# Patient Record
Sex: MALE | Race: WHITE | NOT HISPANIC OR LATINO | URBAN - METROPOLITAN AREA
[De-identification: names, ages, dates, MRNs, and addresses within clinical notes are randomized per-mention and may not be internally consistent; named-entity substitution may affect disease eponyms.]

---

## 2023-01-16 ENCOUNTER — INPATIENT (INPATIENT)
Facility: HOSPITAL | Age: 63
LOS: 1 days | Discharge: HOME | DRG: 920 | End: 2023-01-18
Attending: STUDENT IN AN ORGANIZED HEALTH CARE EDUCATION/TRAINING PROGRAM | Admitting: STUDENT IN AN ORGANIZED HEALTH CARE EDUCATION/TRAINING PROGRAM
Payer: COMMERCIAL

## 2023-01-16 VITALS
TEMPERATURE: 98 F | SYSTOLIC BLOOD PRESSURE: 115 MMHG | DIASTOLIC BLOOD PRESSURE: 60 MMHG | WEIGHT: 197.98 LBS | OXYGEN SATURATION: 98 % | RESPIRATION RATE: 18 BRPM | HEART RATE: 70 BPM

## 2023-01-16 DIAGNOSIS — E11.9 TYPE 2 DIABETES MELLITUS WITHOUT COMPLICATIONS: ICD-10-CM

## 2023-01-16 DIAGNOSIS — E78.5 HYPERLIPIDEMIA, UNSPECIFIED: ICD-10-CM

## 2023-01-16 DIAGNOSIS — Z98.890 OTHER SPECIFIED POSTPROCEDURAL STATES: Chronic | ICD-10-CM

## 2023-01-16 DIAGNOSIS — K62.5 HEMORRHAGE OF ANUS AND RECTUM: ICD-10-CM

## 2023-01-16 DIAGNOSIS — D62 ACUTE POSTHEMORRHAGIC ANEMIA: ICD-10-CM

## 2023-01-16 DIAGNOSIS — K92.2 GASTROINTESTINAL HEMORRHAGE, UNSPECIFIED: ICD-10-CM

## 2023-01-16 DIAGNOSIS — R55 SYNCOPE AND COLLAPSE: ICD-10-CM

## 2023-01-16 DIAGNOSIS — Z29.9 ENCOUNTER FOR PROPHYLACTIC MEASURES, UNSPECIFIED: ICD-10-CM

## 2023-01-16 LAB
ALBUMIN SERPL ELPH-MCNC: 3.3 G/DL — LOW (ref 3.5–5)
ALP SERPL-CCNC: 106 U/L — SIGNIFICANT CHANGE UP (ref 40–120)
ALT FLD-CCNC: 30 U/L DA — SIGNIFICANT CHANGE UP (ref 10–60)
ANION GAP SERPL CALC-SCNC: 4 MMOL/L — LOW (ref 5–17)
APTT BLD: 32.6 SEC — SIGNIFICANT CHANGE UP (ref 27.5–35.5)
AST SERPL-CCNC: 15 U/L — SIGNIFICANT CHANGE UP (ref 10–40)
BASOPHILS # BLD AUTO: 0.01 K/UL — SIGNIFICANT CHANGE UP (ref 0–0.2)
BASOPHILS # BLD AUTO: 0.02 K/UL — SIGNIFICANT CHANGE UP (ref 0–0.2)
BASOPHILS NFR BLD AUTO: 0.2 % — SIGNIFICANT CHANGE UP (ref 0–2)
BASOPHILS NFR BLD AUTO: 0.4 % — SIGNIFICANT CHANGE UP (ref 0–2)
BILIRUB SERPL-MCNC: 0.5 MG/DL — SIGNIFICANT CHANGE UP (ref 0.2–1.2)
BLD GP AB SCN SERPL QL: SIGNIFICANT CHANGE UP
BUN SERPL-MCNC: 22 MG/DL — HIGH (ref 7–18)
CALCIUM SERPL-MCNC: 8.6 MG/DL — SIGNIFICANT CHANGE UP (ref 8.4–10.5)
CHLORIDE SERPL-SCNC: 110 MMOL/L — HIGH (ref 96–108)
CO2 SERPL-SCNC: 30 MMOL/L — SIGNIFICANT CHANGE UP (ref 22–31)
CREAT SERPL-MCNC: 0.93 MG/DL — SIGNIFICANT CHANGE UP (ref 0.5–1.3)
EGFR: 92 ML/MIN/1.73M2 — SIGNIFICANT CHANGE UP
EOSINOPHIL # BLD AUTO: 0.07 K/UL — SIGNIFICANT CHANGE UP (ref 0–0.5)
EOSINOPHIL # BLD AUTO: 0.07 K/UL — SIGNIFICANT CHANGE UP (ref 0–0.5)
EOSINOPHIL NFR BLD AUTO: 1.4 % — SIGNIFICANT CHANGE UP (ref 0–6)
EOSINOPHIL NFR BLD AUTO: 1.6 % — SIGNIFICANT CHANGE UP (ref 0–6)
FLUAV AG NPH QL: SIGNIFICANT CHANGE UP
FLUBV AG NPH QL: SIGNIFICANT CHANGE UP
GLUCOSE BLDC GLUCOMTR-MCNC: 135 MG/DL — HIGH (ref 70–99)
GLUCOSE SERPL-MCNC: 250 MG/DL — HIGH (ref 70–99)
HCT VFR BLD CALC: 32.3 % — LOW (ref 39–50)
HCT VFR BLD CALC: 34.2 % — LOW (ref 39–50)
HCT VFR BLD CALC: 34.5 % — LOW (ref 39–50)
HGB BLD-MCNC: 11 G/DL — LOW (ref 13–17)
HGB BLD-MCNC: 11.8 G/DL — LOW (ref 13–17)
HGB BLD-MCNC: 11.8 G/DL — LOW (ref 13–17)
IMM GRANULOCYTES NFR BLD AUTO: 0.4 % — SIGNIFICANT CHANGE UP (ref 0–0.9)
IMM GRANULOCYTES NFR BLD AUTO: 0.5 % — SIGNIFICANT CHANGE UP (ref 0–0.9)
INR BLD: 1.3 RATIO — HIGH (ref 0.88–1.16)
LIDOCAIN IGE QN: 128 U/L — SIGNIFICANT CHANGE UP (ref 73–393)
LYMPHOCYTES # BLD AUTO: 1.15 K/UL — SIGNIFICANT CHANGE UP (ref 1–3.3)
LYMPHOCYTES # BLD AUTO: 1.85 K/UL — SIGNIFICANT CHANGE UP (ref 1–3.3)
LYMPHOCYTES # BLD AUTO: 23.5 % — SIGNIFICANT CHANGE UP (ref 13–44)
LYMPHOCYTES # BLD AUTO: 42 % — SIGNIFICANT CHANGE UP (ref 13–44)
MCHC RBC-ENTMCNC: 30.1 PG — SIGNIFICANT CHANGE UP (ref 27–34)
MCHC RBC-ENTMCNC: 30.3 PG — SIGNIFICANT CHANGE UP (ref 27–34)
MCHC RBC-ENTMCNC: 30.6 PG — SIGNIFICANT CHANGE UP (ref 27–34)
MCHC RBC-ENTMCNC: 34.1 GM/DL — SIGNIFICANT CHANGE UP (ref 32–36)
MCHC RBC-ENTMCNC: 34.2 GM/DL — SIGNIFICANT CHANGE UP (ref 32–36)
MCHC RBC-ENTMCNC: 34.5 GM/DL — SIGNIFICANT CHANGE UP (ref 32–36)
MCV RBC AUTO: 88.5 FL — SIGNIFICANT CHANGE UP (ref 80–100)
MCV RBC AUTO: 88.5 FL — SIGNIFICANT CHANGE UP (ref 80–100)
MCV RBC AUTO: 88.6 FL — SIGNIFICANT CHANGE UP (ref 80–100)
MONOCYTES # BLD AUTO: 0.37 K/UL — SIGNIFICANT CHANGE UP (ref 0–0.9)
MONOCYTES # BLD AUTO: 0.41 K/UL — SIGNIFICANT CHANGE UP (ref 0–0.9)
MONOCYTES NFR BLD AUTO: 8.4 % — SIGNIFICANT CHANGE UP (ref 2–14)
MONOCYTES NFR BLD AUTO: 8.4 % — SIGNIFICANT CHANGE UP (ref 2–14)
NEUTROPHILS # BLD AUTO: 2.09 K/UL — SIGNIFICANT CHANGE UP (ref 1.8–7.4)
NEUTROPHILS # BLD AUTO: 3.23 K/UL — SIGNIFICANT CHANGE UP (ref 1.8–7.4)
NEUTROPHILS NFR BLD AUTO: 47.3 % — SIGNIFICANT CHANGE UP (ref 43–77)
NEUTROPHILS NFR BLD AUTO: 65.9 % — SIGNIFICANT CHANGE UP (ref 43–77)
NRBC # BLD: 0 /100 WBCS — SIGNIFICANT CHANGE UP (ref 0–0)
OB PNL STL: POSITIVE
PLATELET # BLD AUTO: 184 K/UL — SIGNIFICANT CHANGE UP (ref 150–400)
PLATELET # BLD AUTO: 186 K/UL — SIGNIFICANT CHANGE UP (ref 150–400)
PLATELET # BLD AUTO: 192 K/UL — SIGNIFICANT CHANGE UP (ref 150–400)
POTASSIUM SERPL-MCNC: 4.1 MMOL/L — SIGNIFICANT CHANGE UP (ref 3.5–5.3)
POTASSIUM SERPL-SCNC: 4.1 MMOL/L — SIGNIFICANT CHANGE UP (ref 3.5–5.3)
PROT SERPL-MCNC: 5.7 G/DL — LOW (ref 6–8.3)
PROTHROM AB SERPL-ACNC: 15.5 SEC — HIGH (ref 10.5–13.4)
RBC # BLD: 3.65 M/UL — LOW (ref 4.2–5.8)
RBC # BLD: 3.86 M/UL — LOW (ref 4.2–5.8)
RBC # BLD: 3.9 M/UL — LOW (ref 4.2–5.8)
RBC # FLD: 12.6 % — SIGNIFICANT CHANGE UP (ref 10.3–14.5)
RBC # FLD: 12.7 % — SIGNIFICANT CHANGE UP (ref 10.3–14.5)
RBC # FLD: 12.8 % — SIGNIFICANT CHANGE UP (ref 10.3–14.5)
SARS-COV-2 RNA SPEC QL NAA+PROBE: SIGNIFICANT CHANGE UP
SODIUM SERPL-SCNC: 144 MMOL/L — SIGNIFICANT CHANGE UP (ref 135–145)
WBC # BLD: 4.41 K/UL — SIGNIFICANT CHANGE UP (ref 3.8–10.5)
WBC # BLD: 4.9 K/UL — SIGNIFICANT CHANGE UP (ref 3.8–10.5)
WBC # BLD: 5.26 K/UL — SIGNIFICANT CHANGE UP (ref 3.8–10.5)
WBC # FLD AUTO: 4.41 K/UL — SIGNIFICANT CHANGE UP (ref 3.8–10.5)
WBC # FLD AUTO: 4.9 K/UL — SIGNIFICANT CHANGE UP (ref 3.8–10.5)
WBC # FLD AUTO: 5.26 K/UL — SIGNIFICANT CHANGE UP (ref 3.8–10.5)

## 2023-01-16 PROCEDURE — 99223 1ST HOSP IP/OBS HIGH 75: CPT | Mod: GC

## 2023-01-16 PROCEDURE — 93010 ELECTROCARDIOGRAM REPORT: CPT

## 2023-01-16 PROCEDURE — 74174 CTA ABD&PLVS W/CONTRAST: CPT | Mod: 26,MA

## 2023-01-16 PROCEDURE — 99285 EMERGENCY DEPT VISIT HI MDM: CPT

## 2023-01-16 RX ORDER — LEVOTHYROXINE SODIUM 125 MCG
0 TABLET ORAL
Qty: 0 | Refills: 0 | DISCHARGE

## 2023-01-16 RX ORDER — DEXTROSE 50 % IN WATER 50 %
25 SYRINGE (ML) INTRAVENOUS ONCE
Refills: 0 | Status: DISCONTINUED | OUTPATIENT
Start: 2023-01-16 | End: 2023-01-18

## 2023-01-16 RX ORDER — LEVOTHYROXINE SODIUM 125 MCG
1 TABLET ORAL
Qty: 0 | Refills: 0 | DISCHARGE

## 2023-01-16 RX ORDER — INSULIN LISPRO 100/ML
VIAL (ML) SUBCUTANEOUS
Refills: 0 | Status: DISCONTINUED | OUTPATIENT
Start: 2023-01-16 | End: 2023-01-16

## 2023-01-16 RX ORDER — INSULIN LISPRO 100/ML
VIAL (ML) SUBCUTANEOUS EVERY 6 HOURS
Refills: 0 | Status: DISCONTINUED | OUTPATIENT
Start: 2023-01-16 | End: 2023-01-18

## 2023-01-16 RX ORDER — ATORVASTATIN CALCIUM 80 MG/1
0 TABLET, FILM COATED ORAL
Qty: 0 | Refills: 1 | DISCHARGE

## 2023-01-16 RX ORDER — SODIUM CHLORIDE 9 MG/ML
1000 INJECTION INTRAMUSCULAR; INTRAVENOUS; SUBCUTANEOUS
Refills: 0 | Status: DISCONTINUED | OUTPATIENT
Start: 2023-01-16 | End: 2023-01-18

## 2023-01-16 RX ORDER — METFORMIN HYDROCHLORIDE 850 MG/1
0 TABLET ORAL
Qty: 0 | Refills: 0 | DISCHARGE

## 2023-01-16 RX ORDER — SODIUM CHLORIDE 9 MG/ML
1000 INJECTION INTRAMUSCULAR; INTRAVENOUS; SUBCUTANEOUS ONCE
Refills: 0 | Status: COMPLETED | OUTPATIENT
Start: 2023-01-16 | End: 2023-01-16

## 2023-01-16 RX ORDER — ASPIRIN/CALCIUM CARB/MAGNESIUM 324 MG
1 TABLET ORAL
Qty: 0 | Refills: 0 | DISCHARGE

## 2023-01-16 RX ORDER — PANTOPRAZOLE SODIUM 20 MG/1
40 TABLET, DELAYED RELEASE ORAL
Refills: 0 | Status: DISCONTINUED | OUTPATIENT
Start: 2023-01-16 | End: 2023-01-18

## 2023-01-16 RX ORDER — PANTOPRAZOLE SODIUM 20 MG/1
40 TABLET, DELAYED RELEASE ORAL DAILY
Refills: 0 | Status: DISCONTINUED | OUTPATIENT
Start: 2023-01-16 | End: 2023-01-16

## 2023-01-16 RX ORDER — LEVOTHYROXINE SODIUM 125 MCG
37.5 TABLET ORAL AT BEDTIME
Refills: 0 | Status: DISCONTINUED | OUTPATIENT
Start: 2023-01-16 | End: 2023-01-16

## 2023-01-16 RX ORDER — LEVOTHYROXINE SODIUM 125 MCG
50 TABLET ORAL DAILY
Refills: 0 | Status: DISCONTINUED | OUTPATIENT
Start: 2023-01-16 | End: 2023-01-18

## 2023-01-16 RX ORDER — CHOLECALCIFEROL (VITAMIN D3) 125 MCG
0 CAPSULE ORAL
Qty: 0 | Refills: 0 | DISCHARGE

## 2023-01-16 RX ORDER — INSULIN LISPRO 100/ML
VIAL (ML) SUBCUTANEOUS AT BEDTIME
Refills: 0 | Status: DISCONTINUED | OUTPATIENT
Start: 2023-01-16 | End: 2023-01-16

## 2023-01-16 RX ADMIN — SODIUM CHLORIDE 100 MILLILITER(S): 9 INJECTION INTRAMUSCULAR; INTRAVENOUS; SUBCUTANEOUS at 17:06

## 2023-01-16 RX ADMIN — SODIUM CHLORIDE 1000 MILLILITER(S): 9 INJECTION INTRAMUSCULAR; INTRAVENOUS; SUBCUTANEOUS at 14:07

## 2023-01-16 NOTE — ED PROVIDER NOTE - CLINICAL SUMMARY MEDICAL DECISION MAKING FREE TEXT BOX
62-year-old male hx of HTN, HLD, prior rectal bleeding with colonoscopy 6 days ago that demonstrated 2 polyps which were removed and nonbleeding hemorrhoids, presenting with 3 episodes of BRBPR today and an episode of syncope. Syncope likely vasovagal, possible component of dehydration. Hgb 11.8, will repeat in 4 to 6 hours given maureen blood on exam and reassess. If stable patient can be discharged with GI followup.

## 2023-01-16 NOTE — H&P ADULT - HISTORY OF PRESENT ILLNESS
63 yo M from home w/ pmhx of  DM and HLD, who underwent OP colonoscopy on Tuesday 1/10/23 with removal of 2 polyps, found also to have non-bleeding hemorrhoids is presenting with 2 episodes of painless BRBPR since yesterday. As per pt he had a small bloody BM at midnight. Then this morning at around 4 AM he had BRBPR, as per pt the toilet bowl was mostly blood w/ clots, scant loose stool. Pt informs that while on the toilet bowl he felt nauseous but did not  vomit associated with sweats. He got up from the toilet, walked a few steps and then blacked out for a few minutes. Pt informs that this is the first time, something like this ever happened to him.      At the time of my assessment pt feels ok, denies HA, SOB, feeling lightheaded, N/V,  no abdominal pain. Has not had a BM since coming to the hospital.     ED  course:   /60 HR 70 RR 18 O2SAT 98% on RA  Hgb 11 (MCV 88.5)  FOBT (+)  CTA A/P increased attenuation on distal ascending colon, 8 cm distal to hemostatic clip which may represent hemorrhage vs motion artifact  s/p 1L bolus of IVF

## 2023-01-16 NOTE — H&P ADULT - ASSESSMENT
63 yo M from home w/ pmhx of  DM and HLD, s/p OP colonoscopy on 1/10/23 with polypectomy  is coming in with 12 hr hx of 2 episodes of painless BRBPR, last episode this morning at ~4AM. After which he had a syncopal episode which was likely vasovagal given the circumstances and associated symptoms. At the time of my assessment pt asymptomatic and w/o new episodes of BRBPR since being in the ED VSS, PE;               Hgb 11 (MCV 88.5)  FOBT (+)  CTA A/P increased attenuation on distal ascending colon, 8 cm distal to hemostatic clip which may represent hemorrhage vs motion artifact  s/p 1L bolus of IVF     61 yo M from home w/ pmhx of  DM and HLD, s/p OP colonoscopy on 1/10/23 with polypectomy  is coming in with 12 hr hx of 2 episodes of painless BRBPR, last episode this morning at ~4AM. After which he had a syncopal episode which was likely vasovagal given the circumstances and associated symptoms. At the time of my assessment pt asymptomatic and w/o new episodes of BRBPR since being in the ED VSS, PE; unremarkable, however, as per ED attending note on rectal exam found to have maureen blood mixed with stool. On labs H/H stable at 11, FOBT (+)  CTA A/P high attenuation in distal ascending colon, 8 cm distal to hemostatic clip which may represent hemorrhage vs motion artifact. Pt is being admitted for LGI and syncope.

## 2023-01-16 NOTE — ED ADULT NURSE NOTE - OBJECTIVE STATEMENT
IMPROVE-DD Application Not Available pt noticed rectal bleeding at 23:00, passed out at 4 am after rectal bleeding , found himself lying on the floor, pt denies any pain , takes aspirin once a day

## 2023-01-16 NOTE — H&P ADULT - PROBLEM SELECTOR PLAN 1
Painless lower GIB possibly due to angiectasis given findings on CT, less likely bleeding from polyp removal  H/H stable at 11  NPO   standing IVF w/ NS@ 100cc/ hr  once BG <150 can be started on IVF NS + D5  keep 2 large bore IV access   given stable H/H so far and given that pt has not had new episodes of BRBPR, will trend CBC Q 12 hrs. In the event pt H/H starts dropping or pt symptomatic  repeat CBC Q 6 hrs  maintain active type and screen   GI consult. for repeat colonoscopy

## 2023-01-16 NOTE — H&P ADULT - NSICDXFAMILYHX_GEN_ALL_CORE_FT
FAMILY HISTORY:  Sibling  Still living? Unknown  FH: multiple myeloma, Age at diagnosis: Age Unknown

## 2023-01-16 NOTE — ED PROVIDER NOTE - PROGRESS NOTE DETAILS
Spoke with Gi Dr. Velázquez over phone, recalls pt, states he clipped 15mm polyp in ascending colon. Recommended CT angio of abd/pel to evaluate for acute bleeding.

## 2023-01-16 NOTE — ED PROVIDER NOTE - OBJECTIVE STATEMENT
62-year-old male hx of HTN, HLD, prior rectal bleeding with colonoscopy 6 days ago that demonstrated 2 polyps which were removed and nonbleeding hemorrhoids, presenting with 3 episodes of BRBPR today and an episode of syncope. Had 2 episodes of bloody stool with less than 1 tablespoon, and another episode diarrhea with maureen blood. As he was on the toilet for this third episode, he developed lightheadedness and nausea, walked out of the bathroom to get to his wife but then passed out on the ground for a few minutes. No headstrike. Now feels ok. No other symptoms.

## 2023-01-16 NOTE — H&P ADULT - PROBLEM SELECTOR PLAN 2
2/2 to lower GIB  asymptomatic  H/H stable on repeat CBC  next CBC repeat @ 4 PM  plan as per above  maintain active type and screen

## 2023-01-16 NOTE — H&P ADULT - NSHPPHYSICALEXAM_GEN_ALL_CORE
VITALS:   T(C): 36.7 (01-16-23 @ 15:45), Max: 36.7 (01-16-23 @ 05:32)  HR: 56 (01-16-23 @ 15:45) (56 - 74)  BP: 116/60 (01-16-23 @ 15:45) (98/65 - 116/60)  RR: 18 (01-16-23 @ 15:45) (18 - 19)  SpO2: 99% (01-16-23 @ 15:45) (98% - 99%)    GENERAL: NAD, lying in bed comfortably  HEAD:  Atraumatic, Normocephalic  EYES: EOMI, PERRLA, conjunctiva and sclera clear  ENT: dry mucous membranes  NECK: Supple, No JVD  CHEST/LUNG: Clear to auscultation bilaterally; No rales, rhonchi, wheezing, or rubs. Unlabored respirations  HEART: Regular rate and rhythm; No murmurs, rubs, or gallops  ABDOMEN: BSx4; Soft, nontender, nondistended  EXTREMITIES:  2+ Peripheral Pulses, brisk capillary refill. No clubbing, cyanosis, or edema  NERVOUS SYSTEM:  A&Ox3, no focal deficits   SKIN: No rashes or lesions

## 2023-01-16 NOTE — PATIENT PROFILE ADULT - FALL HARM RISK - HARM RISK INTERVENTIONS

## 2023-01-16 NOTE — H&P ADULT - ATTENDING COMMENTS
61yo M PMHx of CAD, DM2, HLD who presented to the hospital after a syncopal episode. Patient reports having a colonoscopy with polypectomy 6 days ago. Yesterday had 2 episodes of diarrhea with BRBPR. Patient was on the toilet when he syncopized and ended up on the floor. Patient reported feeling a prodromal syndrome with nausea, flushing and sweating. Patient regained consciousness and presented to the hospital. In the ED, vital signs stable. Hgb downtrended from 11.8 to 11.0. FOBT positive. CTA A/P shows possible bleeding source in distal ascending colon. Admit for hematochezia post-polypectomy.    #Hematochezia  #Syncope  #CAD  #DM2  #HLD  #GERD  #Hypothyroidism    -trend Hgb q12h, or sooner if having active bloody bowel movements  -Active Type and Screen, 2 large bore IVs  -syncope likely vasovagal in nature, but monitor on telemetry and check TTE due to h/o CAD  -hold aspirin  -BARBARA  -continue levothyroxine  -CLD  -if continues to have bleeding tomorrow then may need GI evaluation

## 2023-01-16 NOTE — H&P ADULT - PROBLEM SELECTOR PLAN 3
most likely vasovagal given associated symptoms and circumstances. However, cannot r/o orthostatic, less likely cardiogenic  will monitor in telemetry for 24 hrs   obtain orthostatic vitals  fall precautions

## 2023-01-16 NOTE — ED ADULT NURSE NOTE - NSIMPLEMENTINTERV_GEN_ALL_ED
Implemented All Universal Safety Interventions:  [place] to call system. Call bell, personal items and telephone within reach. Instruct patient to call for assistance. Room bathroom lighting operational. Non-slip footwear when patient is off stretcher. Physically safe environment: no spills, clutter or unnecessary equipment. Stretcher in lowest position, wheels locked, appropriate side rails in place.

## 2023-01-17 LAB
ALBUMIN SERPL ELPH-MCNC: 3 G/DL — LOW (ref 3.5–5)
ALP SERPL-CCNC: 70 U/L — SIGNIFICANT CHANGE UP (ref 40–120)
ALT FLD-CCNC: 28 U/L DA — SIGNIFICANT CHANGE UP (ref 10–60)
ANION GAP SERPL CALC-SCNC: 6 MMOL/L — SIGNIFICANT CHANGE UP (ref 5–17)
AST SERPL-CCNC: 13 U/L — SIGNIFICANT CHANGE UP (ref 10–40)
BASOPHILS # BLD AUTO: 0.02 K/UL — SIGNIFICANT CHANGE UP (ref 0–0.2)
BASOPHILS NFR BLD AUTO: 0.5 % — SIGNIFICANT CHANGE UP (ref 0–2)
BILIRUB SERPL-MCNC: 0.7 MG/DL — SIGNIFICANT CHANGE UP (ref 0.2–1.2)
BUN SERPL-MCNC: 14 MG/DL — SIGNIFICANT CHANGE UP (ref 7–18)
CALCIUM SERPL-MCNC: 8.4 MG/DL — SIGNIFICANT CHANGE UP (ref 8.4–10.5)
CHLORIDE SERPL-SCNC: 110 MMOL/L — HIGH (ref 96–108)
CO2 SERPL-SCNC: 28 MMOL/L — SIGNIFICANT CHANGE UP (ref 22–31)
CREAT SERPL-MCNC: 0.73 MG/DL — SIGNIFICANT CHANGE UP (ref 0.5–1.3)
EGFR: 102 ML/MIN/1.73M2 — SIGNIFICANT CHANGE UP
EOSINOPHIL # BLD AUTO: 0.09 K/UL — SIGNIFICANT CHANGE UP (ref 0–0.5)
EOSINOPHIL NFR BLD AUTO: 2.4 % — SIGNIFICANT CHANGE UP (ref 0–6)
GLUCOSE BLDC GLUCOMTR-MCNC: 104 MG/DL — HIGH (ref 70–99)
GLUCOSE BLDC GLUCOMTR-MCNC: 109 MG/DL — HIGH (ref 70–99)
GLUCOSE BLDC GLUCOMTR-MCNC: 111 MG/DL — HIGH (ref 70–99)
GLUCOSE BLDC GLUCOMTR-MCNC: 112 MG/DL — HIGH (ref 70–99)
GLUCOSE BLDC GLUCOMTR-MCNC: 116 MG/DL — HIGH (ref 70–99)
GLUCOSE BLDC GLUCOMTR-MCNC: 125 MG/DL — HIGH (ref 70–99)
GLUCOSE BLDC GLUCOMTR-MCNC: 130 MG/DL — HIGH (ref 70–99)
GLUCOSE BLDC GLUCOMTR-MCNC: 144 MG/DL — HIGH (ref 70–99)
GLUCOSE SERPL-MCNC: 114 MG/DL — HIGH (ref 70–99)
HCT VFR BLD CALC: 31 % — LOW (ref 39–50)
HCT VFR BLD CALC: 31.9 % — LOW (ref 39–50)
HCV AB S/CO SERPL IA: 0.08 S/CO — SIGNIFICANT CHANGE UP (ref 0–0.99)
HCV AB SERPL-IMP: SIGNIFICANT CHANGE UP
HGB BLD-MCNC: 10.6 G/DL — LOW (ref 13–17)
HGB BLD-MCNC: 10.8 G/DL — LOW (ref 13–17)
IMM GRANULOCYTES NFR BLD AUTO: 0.3 % — SIGNIFICANT CHANGE UP (ref 0–0.9)
LYMPHOCYTES # BLD AUTO: 1.52 K/UL — SIGNIFICANT CHANGE UP (ref 1–3.3)
LYMPHOCYTES # BLD AUTO: 41.3 % — SIGNIFICANT CHANGE UP (ref 13–44)
MAGNESIUM SERPL-MCNC: 2 MG/DL — SIGNIFICANT CHANGE UP (ref 1.6–2.6)
MCHC RBC-ENTMCNC: 29.5 PG — SIGNIFICANT CHANGE UP (ref 27–34)
MCHC RBC-ENTMCNC: 29.9 PG — SIGNIFICANT CHANGE UP (ref 27–34)
MCHC RBC-ENTMCNC: 33.9 GM/DL — SIGNIFICANT CHANGE UP (ref 32–36)
MCHC RBC-ENTMCNC: 34.2 GM/DL — SIGNIFICANT CHANGE UP (ref 32–36)
MCV RBC AUTO: 87.2 FL — SIGNIFICANT CHANGE UP (ref 80–100)
MCV RBC AUTO: 87.3 FL — SIGNIFICANT CHANGE UP (ref 80–100)
MONOCYTES # BLD AUTO: 0.25 K/UL — SIGNIFICANT CHANGE UP (ref 0–0.9)
MONOCYTES NFR BLD AUTO: 6.8 % — SIGNIFICANT CHANGE UP (ref 2–14)
NEUTROPHILS # BLD AUTO: 1.79 K/UL — LOW (ref 1.8–7.4)
NEUTROPHILS NFR BLD AUTO: 48.7 % — SIGNIFICANT CHANGE UP (ref 43–77)
NRBC # BLD: 0 /100 WBCS — SIGNIFICANT CHANGE UP (ref 0–0)
NRBC # BLD: 0 /100 WBCS — SIGNIFICANT CHANGE UP (ref 0–0)
PHOSPHATE SERPL-MCNC: 2.5 MG/DL — SIGNIFICANT CHANGE UP (ref 2.5–4.5)
PLATELET # BLD AUTO: 155 K/UL — SIGNIFICANT CHANGE UP (ref 150–400)
PLATELET # BLD AUTO: 165 K/UL — SIGNIFICANT CHANGE UP (ref 150–400)
POTASSIUM SERPL-MCNC: 3.3 MMOL/L — LOW (ref 3.5–5.3)
POTASSIUM SERPL-SCNC: 3.3 MMOL/L — LOW (ref 3.5–5.3)
PROT SERPL-MCNC: 5.4 G/DL — LOW (ref 6–8.3)
RBC # BLD: 3.55 M/UL — LOW (ref 4.2–5.8)
RBC # BLD: 3.66 M/UL — LOW (ref 4.2–5.8)
RBC # FLD: 13 % — SIGNIFICANT CHANGE UP (ref 10.3–14.5)
RBC # FLD: 13 % — SIGNIFICANT CHANGE UP (ref 10.3–14.5)
SARS-COV-2 RNA SPEC QL NAA+PROBE: SIGNIFICANT CHANGE UP
SODIUM SERPL-SCNC: 144 MMOL/L — SIGNIFICANT CHANGE UP (ref 135–145)
WBC # BLD: 3.68 K/UL — LOW (ref 3.8–10.5)
WBC # BLD: 3.79 K/UL — LOW (ref 3.8–10.5)
WBC # FLD AUTO: 3.68 K/UL — LOW (ref 3.8–10.5)
WBC # FLD AUTO: 3.79 K/UL — LOW (ref 3.8–10.5)

## 2023-01-17 PROCEDURE — 99233 SBSQ HOSP IP/OBS HIGH 50: CPT | Mod: GC

## 2023-01-17 PROCEDURE — 99222 1ST HOSP IP/OBS MODERATE 55: CPT

## 2023-01-17 RX ORDER — SOD SULF/SODIUM/NAHCO3/KCL/PEG
2000 SOLUTION, RECONSTITUTED, ORAL ORAL ONCE
Refills: 0 | Status: COMPLETED | OUTPATIENT
Start: 2023-01-17 | End: 2023-01-17

## 2023-01-17 RX ORDER — POTASSIUM CHLORIDE 20 MEQ
40 PACKET (EA) ORAL ONCE
Refills: 0 | Status: COMPLETED | OUTPATIENT
Start: 2023-01-17 | End: 2023-01-17

## 2023-01-17 RX ADMIN — SODIUM CHLORIDE 100 MILLILITER(S): 9 INJECTION INTRAMUSCULAR; INTRAVENOUS; SUBCUTANEOUS at 07:58

## 2023-01-17 RX ADMIN — Medication 2000 MILLILITER(S): at 17:04

## 2023-01-17 RX ADMIN — Medication 50 MICROGRAM(S): at 06:30

## 2023-01-17 RX ADMIN — Medication 40 MILLIEQUIVALENT(S): at 09:58

## 2023-01-17 RX ADMIN — PANTOPRAZOLE SODIUM 40 MILLIGRAM(S): 20 TABLET, DELAYED RELEASE ORAL at 06:31

## 2023-01-17 RX ADMIN — Medication 0: at 00:32

## 2023-01-17 NOTE — CONSULT NOTE ADULT - SUBJECTIVE AND OBJECTIVE BOX
[facility]    Patient is a 62y old  Male who presents with a chief complaint of BRBPR (17 Jan 2023 12:41)    HPI:  61 yo M from home w/ pmhx of  DM and HLD, who underwent OP colonoscopy on Tuesday 1/10/23 with removal of 2 polyps, found also to have non-bleeding hemorrhoids is presenting with 2 episodes of painless BRBPR since yesterday. As per pt he had a small bloody BM at midnight. Then this morning at around 4 AM he had BRBPR, as per pt the toilet bowl was mostly blood w/ clots, scant loose stool. Pt informs that while on the toilet bowl he felt nauseous but did not  vomit associated with sweats. He got up from the toilet, walked a few steps and then blacked out for a few minutes. Pt informs that this is the first time, something like this ever happened to him.      At the time of my assessment pt feels ok, denies HA, SOB, feeling lightheaded, N/V,  no abdominal pain. Has not had a BM since coming to the hospital.     GI HPI: Patient had a recent colonoscopy on 1/10/23 at [facility] s/p polypectomy, who presented with bright red blood in the toilet bowl after a BM with some clots associated with a syncopal episode following the bowel movement. Patient states he has never experienced hematochezia before. Hgb on admission 11.8. GI was consulted for BRBPR. CTA A/P neg for acute GIB.    ED  course:   /60 HR 70 RR 18 O2SAT 98% on RA  Hgb 11 (MCV 88.5)  FOBT (+)  CTA A/P increased attenuation on distal ascending colon, 8 cm distal to hemostatic clip which may represent hemorrhage vs motion artifact  s/p 1L bolus of IVF (16 Jan 2023 15:48)      PMH/PSH:  PAST MEDICAL & SURGICAL HISTORY:  HLD (hyperlipidemia)  DM (diabetes mellitus)  S/P colonoscopic polypectomy (1/10/23)        FH:  FAMILY HISTORY:  FH: multiple myeloma (Sibling)      MEDS:  MEDICATIONS  (STANDING):  dextrose 50% Injectable 25 Gram(s) IV Push once  insulin lispro (ADMELOG) corrective regimen sliding scale   SubCutaneous every 6 hours  levothyroxine 50 MICROGram(s) Oral daily  pantoprazole    Tablet 40 milliGRAM(s) Oral before breakfast  polyethylene glycol/electrolyte Solution. 2000 milliLiter(s) Oral once  sodium chloride 0.9%. 1000 milliLiter(s) (100 mL/Hr) IV Continuous <Continuous>    MEDICATIONS  (PRN):    Allergies  No Known Allergies  Intolerances        ROS: A detailed set of ROS were asked and negative except those outlined in GI HPI.   ______________________________________________________________________  PHYSICAL EXAM:    GEN: NAD  HEENT: EOMI, conjunctivae anicteric, neck supple, moist mucous membranes  PULM: LSCTAB, no wheezing, rales, or rhonchi  CV: RRR, no m/r/g  GI: soft, NT, ND; +BS in all four quadrants, no ascites, no Bunch's sign  INO neg, light brown stool in vault  MSK: CA, no edema  NEURO: A&O x 3, no gross deficits    T(C): 36.3 (01-17-23 @ 11:07), Max: 37.1 (01-17-23 @ 04:29)  HR: 60 (01-17-23 @ 11:07)  BP: 112/71 (01-17-23 @ 11:07)  RR: 18 (01-17-23 @ 11:07)  SpO2: 97% (01-17-23 @ 11:07)  Wt(kg): --    ______________________________________________________________________  LABS:                        10.8   3.79  )-----------( 155      ( 17 Jan 2023 11:33 )             31.9     01-17    144  |  110<H>  |  14  ----------------------------<  114<H>  3.3<L>   |  28  |  0.73    Ca    8.4      17 Jan 2023 07:53  Phos  2.5     01-17  Mg     2.0     01-17    TPro  5.4<L>  /  Alb  3.0<L>  /  TBili  0.7  /  DBili  x   /  AST  13  /  ALT  28  /  AlkPhos  70  01-17  LIVER FUNCTIONS - ( 17 Jan 2023 07:53 )  Alb: 3.0 g/dL / Pro: 5.4 g/dL / ALK PHOS: 70 U/L / ALT: 28 U/L DA / AST: 13 U/L / GGT: x         PT/INR - ( 16 Jan 2023 16:00 )   PT: 15.5 sec;   INR: 1.30 ratio    PTT - ( 16 Jan 2023 16:00 )  PTT:32.6 sec    ____________________________________________    IMAGING:    CT ANGIO ABD PELV (W)AW IC                        PROCEDURE DATE:  01/16/2023      FINDINGS:  LOWER CHEST: Within normal limits.    LIVER: Within normal limits.  BILE DUCTS: Normal caliber.  GALLBLADDER: Within normal limits.  SPLEEN: Within normal limits.  PANCREAS: Within normal limits.  ADRENALS: Within normal limits.  KIDNEYS/URETERS: 7 mm nonobstructing left midpole calculus. 1 cm left   lower pole parapelvic cyst. 3.8 cm right midpole cyst. No   hydroureteronephrosis bilaterally. Symmetrical nephrograms.    BLADDER: Within normal limits.  REPRODUCTIVE ORGANS: Mildly enlarged prostate.    BOWEL: No bowel obstruction. There is a hemostatic clip (10:93) in the   proximal ascending colon at the level of the terminal ileum but on the   opposite wall. 8 cm distal to the clip in the distal ascending colon is a   focus of high attenuation on the arterial phase (10:65). No high   attenuation is seen on the delayed phase which may indicate dissipation   ofblood or motion artifact. Appendix is normal.  PERITONEUM: No ascites.  VESSELS: Within normal limits.  RETROPERITONEUM/LYMPH NODES: No lymphadenopathy.  ABDOMINAL WALL: Within normal limits.  BONES: Pectus excavatum deformity. Mild thoracolumbar degenerative   changes.    IMPRESSION:  Arterial phase focal high attenuation in the distal ascending colon 8 cm   distal to the hemostatic clip may represent small focus of acute   hemorrhage or motion artifact as above.    Nonobstructing left renal calculus.

## 2023-01-17 NOTE — PROGRESS NOTE ADULT - ATTENDING COMMENTS
61yo M PMHx of CAD, DM2, HLD who presented to the hospital after a syncopal episode. Patient reports having a colonoscopy with polypectomy 6 days ago. Yesterday had 2 episodes of diarrhea with BRBPR. Patient was on the toilet when he syncopized and ended up on the floor. Patient reported feeling a prodromal syndrome with nausea, flushing and sweating. Patient regained consciousness and presented to the hospital. In the ED, vital signs stable. Hgb downtrended from 11.8 to 11.0. FOBT positive. CTA A/P shows possible bleeding source in distal ascending colon. Admit for hematochezia post-polypectomy.    #Acute anemia  #Hematochezia  #Syncope  #CAD  #DM2  #HLD  #GERD  #Hypothyroidism  - GI Consulted - Golytely - monitor for further blood  -trend Hgb q12h - no further bloody BM but downtrended Hgb  -Active Type and Screen, 2 large bore IVs  -syncope likely vasovagal in nature,  TTE due to h/o CAD  -hold aspirin  -BARBARA  -continue levothyroxine  -CLD

## 2023-01-17 NOTE — DISCHARGE NOTE PROVIDER - NSDCMRMEDTOKEN_GEN_ALL_CORE_FT
aspirin 81 mg oral tablet, chewable: 1 tab(s) orally once a day  ATORVASTATIN 40 MG TABLET: TAKE 1 TABLET BY MOUTH EVERY DAY  LEVOTHYROXINE SODIUM  50 MCG TABS: 1 tab(s) orally once a day  METFORMIN  MG TABLET: TAKE 1 TABLET BY MOUTH EVERY DAY  OMEPRAZOLE  20 MG CPDR:   VITAMIN D3 50 MCG SOFTGEL: TAKE 1 CAPSULE BY MOUTH EVERY DAY   aspirin 81 mg oral tablet, chewable: 1 tab(s) orally once a day  ATORVASTATIN 40 MG TABLET: TAKE 1 TABLET BY MOUTH EVERY DAY  LEVOTHYROXINE SODIUM  50 MCG TABS: 1 tab(s) orally once a day  METFORMIN  MG TABLET: TAKE 1 TABLET BY MOUTH EVERY DAY  Protonix 40 mg oral delayed release tablet: 1 tab(s) orally once a day (before a meal)  VITAMIN D3 50 MCG SOFTGEL: TAKE 1 CAPSULE BY MOUTH EVERY DAY

## 2023-01-17 NOTE — CONSULT NOTE ADULT - PROBLEM SELECTOR RECOMMENDATION 9
Patient presented with BRBPR s/p polypectomy on 1/10. At time of eval, spoke with performing endoscopist on the phone, who confirmed removal of two large polyps. Most likely post-polypectomy bleed. No further episode of BRBPR since admission. Hgb this AM was 10.8. No overt signs of bleeding. INO at bedside neg, light brown stool in vault.     	- 2L Golytely now, continue CLD, to flush bowels out for residual blood  	- Maintain active T&S, 2 large-bore peripheral IVs, transfuse for goal Hgb >7 or if symptomatic  	- Repeat CBC 1400, pending  	- No indication for repeat colonoscopy at this time given neg INO, however can reconsider if patient is symptomatic vs Hgb drop vs further episodes of BRBPR or melena  	- IVF per primary team for hydration    This note and its recommendations herein are preliminary until such time as cosigned by an attending.     GI will continue to follow.  Thank you for this consult! Patient presented with BRBPR s/p polypectomy on 1/10. At time of eval, spoke with performing endoscopist on the phone, who confirmed removal of two large polyps. Most likely post-polypectomy bleed. No further episode of BRBPR since admission. Hgb this AM was 10.8. No overt signs of bleeding. INO at bedside neg, light brown stool in vault.     	- 2L Golytely now, continue CLD, to flush bowels out for residual blood  	- Maintain active T&S, 2 large-bore peripheral IVs, transfuse for goal Hgb >7 or if symptomatic  	- Repeat CBC 1400, pending  	- No indication for repeat colonoscopy at this time given neg INO, however can reconsider if patient is symptomatic vs Hgb drop vs further episodes of BRBPR or melena  	- IVF per primary team for hydration  	- IV PPI 40mg BID    This note and its recommendations herein are preliminary until such time as cosigned by an attending.     GI will continue to follow.  Thank you for this consult!

## 2023-01-17 NOTE — CONSULT NOTE ADULT - ASSESSMENT
Patient is a 62M with a PMHx of DM and HLD, who had an OP colonoscopy on Tuesday, 1/10/23, s/p polypectomy (2 polyps), who presented with BRBPR x 2 on Sunday with syncopal episode. GI is consulted for BRBPR.

## 2023-01-17 NOTE — PROGRESS NOTE ADULT - PROBLEM SELECTOR PLAN 5
I have reviewed discharge instructions with the patient. The patient verbalized understanding.
pt on lipitor 40 mg at home  holding for now

## 2023-01-17 NOTE — DISCHARGE NOTE PROVIDER - HOSPITAL COURSE
63 yo M from home w/ pmhx of  DM and HLD, s/p OP colonoscopy on 1/10/23 with polypectomy is coming in with 12 hr hx of 2 episodes of painless BRBPR, last episode this morning at ~4AM. After which he had a syncopal episode which was likely vasovagal given the circumstances and associated symptoms. At the time of my assessment pt asymptomatic and w/o new episodes of BRBPR since being in the ED VSS, PE; unremarkable, however, as per ED attending note on rectal exam found to have maureen blood mixed with stool. On labs H/H stable at 11, FOBT (+) CTA A/P high attenuation in distal ascending colon, 8 cm distal to hemostatic clip which may represent hemorrhage vs motion artifact. Pt is being admitted for LGI and syncope. Pt's H/H has been stable at ______. Pt's diet advanced to CLD. GI Dr. Martin consulted. Golytely given to clear bowel of residual blood. No colonoscopy warranted at this time. Protonix 40 daily started. Pt has a history of DM, home meds metformin held, pt was started on sliding scale insulin and blood sugar was well controlled during his hospital stay. Pt has a history of HLD, home med lipitor was held. Pt had no further acute signs of bleeding and tolerating diet. Pt deemed medically stable for discharge.              61 yo M from home w/ pmhx of  DM and HLD, s/p OP colonoscopy on 1/10/23 with polypectomy is coming in with 12 hr hx of 2 episodes of painless BRBPR, last episode this morning at ~4AM. After which he had a syncopal episode which was likely vasovagal given the circumstances and associated symptoms. At the time of my assessment pt asymptomatic and w/o new episodes of BRBPR since being in the ED VSS, PE; unremarkable, however, as per ED attending note on rectal exam found to have maureen blood mixed with stool. On labs H/H stable at 11, FOBT (+) CTA A/P high attenuation in distal ascending colon, 8 cm distal to hemostatic clip which may represent hemorrhage vs motion artifact. Pt is being admitted for LGI and syncope. Pt's H/H has been stable at 10.7/32. Pt's diet advanced to CLD. GI Dr. Martin consulted. Golytely given to clear bowel of residual blood. No colonoscopy warranted at this time. Protonix 40 daily started. Pt has a history of DM, home meds metformin held, pt was started on sliding scale insulin and blood sugar was well controlled during his hospital stay. Pt has a history of HLD, home med lipitor was held. Pt had no further acute signs of bleeding and tolerating diet. Pt deemed medically stable for discharge.

## 2023-01-17 NOTE — DISCHARGE NOTE PROVIDER - NSDCCPCAREPLAN_GEN_ALL_CORE_FT
PRINCIPAL DISCHARGE DIAGNOSIS  Diagnosis: Rectal bleed  Assessment and Plan of Treatment: You presented to the hospital due to bright red blood found in the toilet after a bowel movement. You recently had a colonscopy procedure done where 2 polyps were removed. The source of the bleeding was likely due to the polypectomy. Your blood counts were stable during your hospital stay and you did not require any blood transfusions. You were seen by GI who did not recommend a colonoscopy for further workup. You did not have any acute signs of bleeding during your hospital stay. You tolerated a clear liquid diet and you were started on protonix 40mg daily. Please follow up with your GI doctor for the results of your colonoscopy and please follow up with your primary care doctor in 1-2 weeks after you are discharged.      SECONDARY DISCHARGE DIAGNOSES  Diagnosis: Syncope  Assessment and Plan of Treatment: You presented to the hospital after a lower GI bleed was noted and had a syncopal episode. This was likely a vasovagal response due to the sight of the blood loss. You were monitored on our telemetry floor during your hospital stay and had no medical issues found. Please follow up with your primary care doctor in 1-2 weeks after you are discharged from the hospital.    Diagnosis: DM (diabetes mellitus)  Assessment and Plan of Treatment: You have a history of diabetes and take metformin at home to control your blood sugar. Your metformin was held while you were in the hospital and your blood sugar was controlled with insulin before mealtimes and at bedtime. Please continue your metformin when you are discharged from the hospital and follow up with your primary care doctor in 1-2 weeks.    Diagnosis: HLD (hyperlipidemia)  Assessment and Plan of Treatment: You have a history of high chosterol and take atorvastatin at home. Your atorvastatin was held during your hospital stay. Please continue taking your home dose of atorvastatin when you are discharged from the hospital and follow up with your primary care doctor in 1-2 weeks     PRINCIPAL DISCHARGE DIAGNOSIS  Diagnosis: Rectal bleed  Assessment and Plan of Treatment: You presented to the hospital due to bright red blood found in the toilet after a bowel movement. You recently had a colonscopy procedure done where 2 polyps were removed. The source of the bleeding was likely due to the polypectomy. Your blood counts were stable during your hospital stay and you did not require any blood transfusions. You were seen by GI who did not recommend a colonoscopy for further workup. You did not have any acute signs of bleeding during your hospital stay. You tolerated a clear liquid diet and you were started on protonix 40mg daily. Please follow up with your GI doctor for the results of your colonoscopy and please follow up with your primary care doctor in 1-2 weeks after you are discharged.      SECONDARY DISCHARGE DIAGNOSES  Diagnosis: Anemia due to acute blood loss  Assessment and Plan of Treatment: You presented to the hospital due to bright red blood found in the toilet after a bowel movement. You recently had a colonscopy procedure done where 2 polyps were removed. The source of the bleeding was likely due to the polypectomy. Your blood counts were stable during your hospital stay and you did not require any blood transfusions. You were seen by GI who did not recommend a colonoscopy for further workup. You did not have any acute signs of bleeding during your hospital stay. You tolerated a clear liquid diet and you were started on protonix 40mg daily. Please follow up with your GI doctor for the results of your colonoscopy and please follow up with your primary care doctor in 1-2 weeks after you are discharged.    Diagnosis: Vasovagal syncope  Assessment and Plan of Treatment: You presented to the hospital after a lower GI bleed was noted and had a syncopal episode. This was likely a vasovagal response due to the sight of the blood loss. You were monitored on our telemetry floor during your hospital stay and had no medical issues found. Please follow up with your primary care doctor in 1-2 weeks after you are discharged from the hospital.    Diagnosis: DM (diabetes mellitus)  Assessment and Plan of Treatment: You have a history of diabetes and take metformin at home to control your blood sugar. Your metformin was held while you were in the hospital and your blood sugar was controlled with insulin before mealtimes and at bedtime. Please continue your metformin when you are discharged from the hospital and follow up with your primary care doctor in 1-2 weeks.    Diagnosis: HLD (hyperlipidemia)  Assessment and Plan of Treatment: You have a history of high chosterol and take atorvastatin at home. Your atorvastatin was held during your hospital stay. Please continue taking your home dose of atorvastatin when you are discharged from the hospital and follow up with your primary care doctor in 1-2 weeks

## 2023-01-17 NOTE — DISCHARGE NOTE PROVIDER - ATTENDING DISCHARGE PHYSICAL EXAMINATION:
63yo M PMHx of CAD, DM2, HLD who presented to the hospital after a syncopal episode. Patient reports having a colonoscopy with polypectomy 6 days ago.  Admit for acute blood loss anemia from GIB post polypectomy and vasovagal syncope. Had 2 episodes of diarrhea with BRBPR. Patient was on the toilet when he syncopized and ended up on the floor. Patient reported feeling a prodromal syndrome with nausea, flushing and sweating. Patient regained consciousness and presented to the hospital. In the ED, vital signs stable. Hgb downtrended from 11.8 to 10.8. CTA A/P shows possible bleeding source in distal ascending colon. Patient with some pink tinged stool but anemia stable. Patient otherwise asymptomatic. GI consulted with no indication for repeat colonoscopy at this time given negative INO. Started on PPI. Patient to follow up with outpatient GI on DC.   PHYSICAL EXAMINATION:  GENERAL: NAD, AAOx3  HEAD: AT/NC  EYES: conjunctiva and sclera clear  NECK: supple, No JVD noted, Normal thyroid  CHEST/LUNG: CTABL; no rales, rhonchi, wheezing, or rubs  HEART: regular rate and rhythm; no murmurs, rubs, or gallops  ABDOMEN: soft, nontender, nondistended; Bowel sounds present  EXTREMITIES:  2+ Peripheral Pulses, No clubbing, cyanosis, or edema  SKIN: warm dry

## 2023-01-18 VITALS
SYSTOLIC BLOOD PRESSURE: 130 MMHG | RESPIRATION RATE: 18 BRPM | HEART RATE: 63 BPM | DIASTOLIC BLOOD PRESSURE: 75 MMHG | TEMPERATURE: 98 F | OXYGEN SATURATION: 95 %

## 2023-01-18 LAB
ANION GAP SERPL CALC-SCNC: 7 MMOL/L — SIGNIFICANT CHANGE UP (ref 5–17)
BUN SERPL-MCNC: 10 MG/DL — SIGNIFICANT CHANGE UP (ref 7–18)
CALCIUM SERPL-MCNC: 8.8 MG/DL — SIGNIFICANT CHANGE UP (ref 8.4–10.5)
CHLORIDE SERPL-SCNC: 109 MMOL/L — HIGH (ref 96–108)
CO2 SERPL-SCNC: 28 MMOL/L — SIGNIFICANT CHANGE UP (ref 22–31)
CREAT SERPL-MCNC: 0.82 MG/DL — SIGNIFICANT CHANGE UP (ref 0.5–1.3)
EGFR: 98 ML/MIN/1.73M2 — SIGNIFICANT CHANGE UP
GLUCOSE BLDC GLUCOMTR-MCNC: 100 MG/DL — HIGH (ref 70–99)
GLUCOSE BLDC GLUCOMTR-MCNC: 129 MG/DL — HIGH (ref 70–99)
GLUCOSE BLDC GLUCOMTR-MCNC: 96 MG/DL — SIGNIFICANT CHANGE UP (ref 70–99)
GLUCOSE SERPL-MCNC: 103 MG/DL — HIGH (ref 70–99)
HCT VFR BLD CALC: 32 % — LOW (ref 39–50)
HGB BLD-MCNC: 10.7 G/DL — LOW (ref 13–17)
MAGNESIUM SERPL-MCNC: 2.2 MG/DL — SIGNIFICANT CHANGE UP (ref 1.6–2.6)
MCHC RBC-ENTMCNC: 29.5 PG — SIGNIFICANT CHANGE UP (ref 27–34)
MCHC RBC-ENTMCNC: 33.4 GM/DL — SIGNIFICANT CHANGE UP (ref 32–36)
MCV RBC AUTO: 88.2 FL — SIGNIFICANT CHANGE UP (ref 80–100)
NRBC # BLD: 0 /100 WBCS — SIGNIFICANT CHANGE UP (ref 0–0)
PHOSPHATE SERPL-MCNC: 2.7 MG/DL — SIGNIFICANT CHANGE UP (ref 2.5–4.5)
PLATELET # BLD AUTO: 182 K/UL — SIGNIFICANT CHANGE UP (ref 150–400)
POTASSIUM SERPL-MCNC: 3.6 MMOL/L — SIGNIFICANT CHANGE UP (ref 3.5–5.3)
POTASSIUM SERPL-SCNC: 3.6 MMOL/L — SIGNIFICANT CHANGE UP (ref 3.5–5.3)
RBC # BLD: 3.63 M/UL — LOW (ref 4.2–5.8)
RBC # FLD: 12.8 % — SIGNIFICANT CHANGE UP (ref 10.3–14.5)
SODIUM SERPL-SCNC: 144 MMOL/L — SIGNIFICANT CHANGE UP (ref 135–145)
WBC # BLD: 3.76 K/UL — LOW (ref 3.8–10.5)
WBC # FLD AUTO: 3.76 K/UL — LOW (ref 3.8–10.5)

## 2023-01-18 PROCEDURE — 84100 ASSAY OF PHOSPHORUS: CPT

## 2023-01-18 PROCEDURE — 85025 COMPLETE CBC W/AUTO DIFF WBC: CPT

## 2023-01-18 PROCEDURE — 80053 COMPREHEN METABOLIC PANEL: CPT

## 2023-01-18 PROCEDURE — 86850 RBC ANTIBODY SCREEN: CPT

## 2023-01-18 PROCEDURE — 85730 THROMBOPLASTIN TIME PARTIAL: CPT

## 2023-01-18 PROCEDURE — 82272 OCCULT BLD FECES 1-3 TESTS: CPT

## 2023-01-18 PROCEDURE — 86900 BLOOD TYPING SEROLOGIC ABO: CPT

## 2023-01-18 PROCEDURE — 80048 BASIC METABOLIC PNL TOTAL CA: CPT

## 2023-01-18 PROCEDURE — 85610 PROTHROMBIN TIME: CPT

## 2023-01-18 PROCEDURE — 93005 ELECTROCARDIOGRAM TRACING: CPT

## 2023-01-18 PROCEDURE — 99285 EMERGENCY DEPT VISIT HI MDM: CPT | Mod: 25

## 2023-01-18 PROCEDURE — 87637 SARSCOV2&INF A&B&RSV AMP PRB: CPT

## 2023-01-18 PROCEDURE — 87635 SARS-COV-2 COVID-19 AMP PRB: CPT

## 2023-01-18 PROCEDURE — 99232 SBSQ HOSP IP/OBS MODERATE 35: CPT

## 2023-01-18 PROCEDURE — 74174 CTA ABD&PLVS W/CONTRAST: CPT | Mod: MA

## 2023-01-18 PROCEDURE — 86901 BLOOD TYPING SEROLOGIC RH(D): CPT

## 2023-01-18 PROCEDURE — 99239 HOSP IP/OBS DSCHRG MGMT >30: CPT | Mod: GC

## 2023-01-18 PROCEDURE — 83735 ASSAY OF MAGNESIUM: CPT

## 2023-01-18 PROCEDURE — 82962 GLUCOSE BLOOD TEST: CPT

## 2023-01-18 PROCEDURE — 36415 COLL VENOUS BLD VENIPUNCTURE: CPT

## 2023-01-18 PROCEDURE — 86803 HEPATITIS C AB TEST: CPT

## 2023-01-18 PROCEDURE — G0378: CPT

## 2023-01-18 PROCEDURE — 83690 ASSAY OF LIPASE: CPT

## 2023-01-18 PROCEDURE — 85027 COMPLETE CBC AUTOMATED: CPT

## 2023-01-18 RX ORDER — PANTOPRAZOLE SODIUM 20 MG/1
1 TABLET, DELAYED RELEASE ORAL
Qty: 30 | Refills: 0
Start: 2023-01-18 | End: 2023-02-16

## 2023-01-18 RX ORDER — OMEPRAZOLE 10 MG/1
0 CAPSULE, DELAYED RELEASE ORAL
Qty: 0 | Refills: 0 | DISCHARGE

## 2023-01-18 RX ADMIN — Medication 50 MICROGRAM(S): at 06:13

## 2023-01-18 RX ADMIN — PANTOPRAZOLE SODIUM 40 MILLIGRAM(S): 20 TABLET, DELAYED RELEASE ORAL at 06:12

## 2023-01-18 NOTE — DISCHARGE NOTE NURSING/CASE MANAGEMENT/SOCIAL WORK - PATIENT PORTAL LINK FT
You can access the FollowMyHealth Patient Portal offered by [facility] by registering at the following website: [email or web address]. By joining [facility] FollowMyHealth portal, you will also be able to view your health information using other applications (apps) compatible with our system.

## 2023-01-18 NOTE — PROGRESS NOTE ADULT - SUBJECTIVE AND OBJECTIVE BOX
GI PROGRESS NOTE    Patient is a 62y old  Male who presents with a chief complaint of bleeding from rectum (17 Jan 2023 15:00)      GI was consulted for brbpr.      HPI:  61 yo M from home w/ pmhx of  DM and HLD, who underwent OP colonoscopy on Tuesday 1/10/23 with removal of 2 polyps, found also to have non-bleeding hemorrhoids is presenting with 2 episodes of painless BRBPR since yesterday. As per pt he had a small bloody BM at midnight. Then this morning at around 4 AM he had BRBPR, as per pt the toilet bowl was mostly blood w/ clots, scant loose stool. Pt informs that while on the toilet bowl he felt nauseous but did not  vomit associated with sweats. He got up from the toilet, walked a few steps and then blacked out for a few minutes. Pt informs that this is the first time, something like this ever happened to him.      At the time of my assessment pt feels ok, denies HA, SOB, feeling lightheaded, N/V,  no abdominal pain. Has not had a BM since coming to the hospital.     ED  course:   /60 HR 70 RR 18 O2SAT 98% on RA  Hgb 11 (MCV 88.5)  FOBT (+)  CTA A/P increased attenuation on distal ascending colon, 8 cm distal to hemostatic clip which may represent hemorrhage vs motion artifact  s/p 1L bolus of IVF     (16 Jan 2023 15:48)      24-HOUR INTERVAL EVENTS: Patient tolerating CLD, endorses he's hungry, otherwise no acute complaints, multiple loose stools overnight 2/2 bowel prep, more pink-tinged in the toilet bowl this AM. VSS. Hgb stable 10.7.   ______________________________________________________________________  MEDS:  MEDICATIONS  (STANDING):  dextrose 50% Injectable 25 Gram(s) IV Push once  insulin lispro (ADMELOG) corrective regimen sliding scale   SubCutaneous every 6 hours  levothyroxine 50 MICROGram(s) Oral daily  pantoprazole    Tablet 40 milliGRAM(s) Oral before breakfast  sodium chloride 0.9%. 1000 milliLiter(s) (100 mL/Hr) IV Continuous <Continuous>    MEDICATIONS  (PRN):    ______________________________________________________________________  ALL:   Allergies  No Known Allergies  Intolerances    ______________________________________________________________________  ______________________________________________________________________  ROS: All other ROS negative.  ______________________________________________________________________  PHYSICAL EXAM:  T(C): 36.3 (01-18-23 @ 11:10), Max: 36.7 (01-17-23 @ 19:52)  HR: 54 (01-18-23 @ 11:10)  BP: 130/76 (01-18-23 @ 11:10)  RR: 18 (01-18-23 @ 11:10)  SpO2: 96% (01-18-23 @ 11:10)  Wt(kg): --    01-17 - 01-18  --------------------------------------------------------  IN:    sodium chloride 0.9%: 900 mL  Total IN: 900 mL    OUT:  Total OUT: 0 mL    Total NET: 900 mL      GEN: NAD  HEENT: EOMI, conjunctivae anicteric, neck supple, moist mucous membranes  PULM: LSCTAB, no wheezing, rales, or rhonchi  CV: RRR, no m/r/g  GI: soft, NT, ND; +BS in all four quadrants, no ascites  MSK: CA, no edema  NEURO: A&O x 3, no gross deficits  ______________________________________________________________________  LABS:                        10.7   3.76  )-----------( 182      ( 18 Jan 2023 06:09 )             32.0     01-18    144  |  109<H>  |  10  ----------------------------<  103<H>  3.6   |  28  |  0.82    Ca    8.8      18 Jan 2023 06:09  Phos  2.7     01-18  Mg     2.2     01-18    TPro  5.4<L>  /  Alb  3.0<L>  /  TBili  0.7  /  DBili  x   /  AST  13  /  ALT  28  /  AlkPhos  70  01-17    LIVER FUNCTIONS - ( 17 Jan 2023 07:53 )  Alb: 3.0 g/dL / Pro: 5.4 g/dL / ALK PHOS: 70 U/L / ALT: 28 U/L DA / AST: 13 U/L / GGT: x           ______________________________________________________________________  IMAGING:    No new imaging. 
PGY-1 Progress Note discussed with attending    PAGER #: [phone]] TILL 5:00 PM  PLEASE CONTACT ON CALL TEAM:  - On Call Team (Please refer to Edith) FROM 5:00 PM - 8:30PM  - Nightfloat Team FROM 8:30 -7:30 AM    OVERNIGHT EVENTS:   - No acute overnight events. Pt seen at bedside, NAD, no acute complaints. Pt reports that he has not had a BM since admission. Denies fevers, chills, CP, SOB, N/V/D, abdominal pain, dysuria, numbness/tingling/weakness in extremities.     REVIEW OF SYSTEMS:  CONSTITUTIONAL: No fever, weight loss, or fatigue  RESPIRATORY: No cough, wheezing, chills or hemoptysis; No shortness of breath  CARDIOVASCULAR: No chest pain, palpitations, dizziness, or leg swelling  GASTROINTESTINAL: No abdominal pain. No nausea, vomiting, or hematemesis; No diarrhea or constipation. No melena or hematochezia.  GENITOURINARY: No dysuria or hematuria, urinary frequency  NEUROLOGICAL: No headaches, memory loss, loss of strength, numbness, or tremors  SKIN: No itching, burning, rashes, or lesions     MEDICATIONS  (STANDING):  dextrose 50% Injectable 25 Gram(s) IV Push once  insulin lispro (ADMELOG) corrective regimen sliding scale   SubCutaneous every 6 hours  levothyroxine 50 MICROGram(s) Oral daily  pantoprazole    Tablet 40 milliGRAM(s) Oral before breakfast  polyethylene glycol/electrolyte Solution. 2000 milliLiter(s) Oral once  sodium chloride 0.9%. 1000 milliLiter(s) (100 mL/Hr) IV Continuous <Continuous>    MEDICATIONS  (PRN):      Vital Signs Last 24 Hrs  T(C): 36.3 (17 Jan 2023 11:07), Max: 37.1 (17 Jan 2023 04:29)  T(F): 97.4 (17 Jan 2023 11:07), Max: 98.7 (17 Jan 2023 04:29)  HR: 60 (17 Jan 2023 11:07) (52 - 60)  BP: 112/71 (17 Jan 2023 11:07) (102/68 - 122/64)  BP(mean): --  RR: 18 (17 Jan 2023 11:07) (16 - 20)  SpO2: 97% (17 Jan 2023 11:07) (97% - 100%)    Parameters below as of 17 Jan 2023 11:07  Patient On (Oxygen Delivery Method): room air        PHYSICAL EXAMINATION:  GENERAL: NAD, AAOx3  HEAD: AT/NC  EYES: conjunctiva and sclera clear  NECK: supple, No JVD noted, Normal thyroid  CHEST/LUNG: CTABL; no rales, rhonchi, wheezing, or rubs  HEART: regular rate and rhythm; no murmurs, rubs, or gallops  ABDOMEN: soft, nontender, nondistended; Bowel sounds present  EXTREMITIES:  2+ Peripheral Pulses, No clubbing, cyanosis, or edema  SKIN: warm dry                          10.8   3.79  )-----------( 155      ( 17 Jan 2023 11:33 )             31.9     01-17    144  |  110<H>  |  14  ----------------------------<  114<H>  3.3<L>   |  28  |  0.73    Ca    8.4      17 Jan 2023 07:53  Phos  2.5     01-17  Mg     2.0     01-17    TPro  5.4<L>  /  Alb  3.0<L>  /  TBili  0.7  /  DBili  x   /  AST  13  /  ALT  28  /  AlkPhos  70  01-17    LIVER FUNCTIONS - ( 17 Jan 2023 07:53 )  Alb: 3.0 g/dL / Pro: 5.4 g/dL / ALK PHOS: 70 U/L / ALT: 28 U/L DA / AST: 13 U/L / GGT: x               PT/INR - ( 16 Jan 2023 16:00 )   PT: 15.5 sec;   INR: 1.30 ratio         PTT - ( 16 Jan 2023 16:00 )  PTT:32.6 sec  COVID-19 PCR: NotDetec (17 Jan 2023 04:22)      CAPILLARY BLOOD GLUCOSE      POCT Blood Glucose.: 130 mg/dL (17 Jan 2023 11:32)  POCT Blood Glucose.: 109 mg/dL (17 Jan 2023 07:41)  POCT Blood Glucose.: 111 mg/dL (17 Jan 2023 07:28)  POCT Blood Glucose.: 104 mg/dL (17 Jan 2023 06:49)  POCT Blood Glucose.: 116 mg/dL (17 Jan 2023 00:23)  POCT Blood Glucose.: 135 mg/dL (16 Jan 2023 17:00)      RADIOLOGY & ADDITIONAL TESTS:

## 2023-01-18 NOTE — PROGRESS NOTE ADULT - ASSESSMENT
Patient is a 62M with a PMHx of DM and HLD, who had an OP colonoscopy on Tuesday, 1/10/23, s/p polypectomy (2 polyps), who presented with BRBPR x 2 on Sunday with syncopal episode. GI is consulted for BRBPR.   
61 yo M from home w/ pmhx of  DM and HLD, s/p OP colonoscopy on 1/10/23 with polypectomy  is coming in with 12 hr hx of 2 episodes of painless BRBPR, last episode this morning at ~4AM. After which he had a syncopal episode which was likely vasovagal given the circumstances and associated symptoms. At the time of my assessment pt asymptomatic and w/o new episodes of BRBPR since being in the ED VSS, PE; unremarkable, however, as per ED attending note on rectal exam found to have maureen blood mixed with stool. On labs H/H stable at 11, FOBT (+)  CTA A/P high attenuation in distal ascending colon, 8 cm distal to hemostatic clip which may represent hemorrhage vs motion artifact. Pt is being admitted for LGI and syncope.

## 2023-01-18 NOTE — PROGRESS NOTE ADULT - PROBLEM SELECTOR PLAN 1
Patient presented with BRBPR s/p polypectomy on 1/10. At time of eval, spoke with performing endoscopist on the phone, who confirmed removal of two large polyps. Most likely post-polypectomy bleed. No further episode of BRBPR since admission. Hgb this AM was 10.7, stable. He reports multiple loose BMs overnight 2/2 bowel prep, pink-tinged color in toilet bowl. VSS.     	- ADAT  	- Maintain active T&S, 2 large-bore peripheral IVs, transfuse for goal Hgb >7 or if symptomatic  	- No indication for repeat colonoscopy at this time given neg INO, however can reconsider if patient is symptomatic vs Hgb drop vs further episodes of BRBPR or melena  	- IVF per primary team for hydration  	- IV PPI 40mg BID, PO Protonix 40mg daily upon discharge  	- Patient to follow up with outpatient GI upon discharge    This note and its recommendations herein are preliminary until such time as cosigned by an attending.     GI will sign off at this time.  Thank you for involving us in the care of Mr. Fernando Pinto.  Please re-consult GI PRN.
Painless lower GIB possibly due to angiectasis given findings on CT, less likely bleeding from polyp removal  H/H stable at 11  NPO   standing IVF w/ NS@ 100cc/ hr  once BG <150 can be started on IVF NS + D5  keep 2 large bore IV access   given stable H/H so far and given that pt has not had new episodes of BRBPR, will trend CBC Q 12 hrs. In the event pt H/H starts dropping or pt symptomatic  repeat CBC Q 6 hrs  maintain active type and screen   GI consult  golytely 2L, monitor for bleeding  keep on CLD

## 2023-01-24 LAB — GLUCOSE BLDC GLUCOMTR-MCNC: 128 MG/DL — HIGH (ref 70–99)

## 2024-11-29 NOTE — ED ADULT TRIAGE NOTE - SOURCE OF INFORMATION
Reason for call:   [x] Refill   [] Prior Auth  [] Other:     Office:   [x] PCP/Provider -   [] Specialty/Provider -     Medication: Valsartan      Dose/Frequency: 320 mg    Quantity: 90 tablet    Pharmacy: [facility] - [facility] - [address] [place] [address].     Does the patient have enough for 3 days?   [] Yes   [x] No - Send as HP to POD    
Patient/EMS